# Patient Record
Sex: FEMALE | Race: ASIAN | NOT HISPANIC OR LATINO | URBAN - METROPOLITAN AREA
[De-identification: names, ages, dates, MRNs, and addresses within clinical notes are randomized per-mention and may not be internally consistent; named-entity substitution may affect disease eponyms.]

---

## 2019-02-15 ENCOUNTER — EMERGENCY (EMERGENCY)
Facility: HOSPITAL | Age: 29
LOS: 1 days | Discharge: HOME | End: 2019-02-15
Attending: EMERGENCY MEDICINE | Admitting: EMERGENCY MEDICINE
Payer: MEDICAID

## 2019-02-15 VITALS
DIASTOLIC BLOOD PRESSURE: 86 MMHG | HEART RATE: 70 BPM | TEMPERATURE: 98 F | RESPIRATION RATE: 16 BRPM | OXYGEN SATURATION: 100 % | SYSTOLIC BLOOD PRESSURE: 136 MMHG

## 2019-02-15 PROCEDURE — 64400 NJX AA&/STRD TRIGEMINAL NRV: CPT

## 2019-02-15 PROCEDURE — 99283 EMERGENCY DEPT VISIT LOW MDM: CPT | Mod: 25

## 2019-02-15 RX ORDER — OXYCODONE HYDROCHLORIDE 5 MG/1
5 TABLET ORAL ONCE
Qty: 0 | Refills: 0 | Status: DISCONTINUED | OUTPATIENT
Start: 2019-02-15 | End: 2019-02-15

## 2019-02-15 RX ADMIN — OXYCODONE HYDROCHLORIDE 5 MILLIGRAM(S): 5 TABLET ORAL at 13:12

## 2019-02-15 NOTE — ED PROVIDER NOTE - OBJECTIVE STATEMENT
28F no pmh presents with several days of worsening toothache and R jaw pain. Endorses R lower molar cavity, went to dentist yesterday who recommended oral surgeons but none of them had open spots this month. ROS: Denies fevers, chills, dysphagia, sore throat, neck swelling, neck pain, CP, Abdominal pain, rhinorrhea, new rashes, new LE edema, new visual changes, confusion, headaches, blood in stools, N/V, dysuria, and hematuria.

## 2019-02-15 NOTE — ED PROVIDER NOTE - CLINICAL SUMMARY MEDICAL DECISION MAKING FREE TEXT BOX
Edgard: R lower molar cavity and jaw pain. Referred yest. by dentist to oral surgeon, but unable to see her today, so came to ED for pain meds and referral. No swelling. Pain control. Ask dentist for oral surgeon.

## 2019-02-15 NOTE — ED PROCEDURE NOTE - ATTENDING CONTRIBUTION TO CARE
I performed a face-to-face evaluation of the patient and performed a history and physical examination. I agree with the history and physical examination.    Edgard: I was present during the critical part of the procedure.

## 2019-02-15 NOTE — ED PROVIDER NOTE - NS ED ROS FT
Constitutional: no fevers, no chills.  Eyes: no visual changes.  Ears: no ear drainage, no ear pain.  Nose: no nasal congestion.  Mouth/Throat: no sore throat. +toothache   Cardiovascular: no chest pain.  Respiratory: no shortness of breath, no wheezing, no cough  Gastrointestinal: no nausea, no vomiting, no diarrhea, no abdominal pain.  MSK: no flank pain, no back pain.  Genitourinary: no dysuria, no hematuria.  Skin: no rashes.  Neuro: no headache,   Psychiatric: no known mental health issues.

## 2019-02-15 NOTE — PROGRESS NOTE ADULT - SUBJECTIVE AND OBJECTIVE BOX
Patient is a 28y old  Female who presents with a chief complaint of dental pain in the lower right quadrant    HPI: intraoral pain      PAST MEDICAL & SURGICAL HISTORY:  No pertinent past medical history  No significant past surgical history    Allergies    ibuprofen (Other)      Vital Signs Last 24 Hrs  T(C): 36.8 (15 Feb 2019 11:41), Max: 36.8 (15 Feb 2019 11:41)  T(F): 98.2 (15 Feb 2019 11:41), Max: 98.2 (15 Feb 2019 11:41)  HR: 70 (15 Feb 2019 11:41) (70 - 70)  BP: 136/86 (15 Feb 2019 11:41) (136/86 - 136/86)  BP(mean): --  RR: 16 (15 Feb 2019 11:41) (16 - 16)  SpO2: 100% (15 Feb 2019 11:41) (100% - 100%)    EOE:  TMJ ( - ) clicks                    (  -  ) pops                    (  - ) crepitus             Mandible - Slightly diminished range of motion. Approximately 35-40mm opening              Facial bones and MOM - grossly intact             ( - ) trismus             ( - ) LAD             ( - ) swelling             ( - ) asymmetry             ( + ) palpation - minor discomfort to palpation over the lower right molar region             ( - ) SOB             ( - ) dysphagia             ( - ) LOC    IOE:  permanent dentition: multiple dental restorations, multiple carious teeth, tooth #30 (mandibular right 1st molar) demonstrates gross disto-occlusal caries and loss of coronal tooth structure           hard/soft palate:  ( - ) palatal torus           tongue/FOM - WNL           labial/buccal mucosa - WNL           ( + ) percussion - tooth #30           ( + ) palpation - minor discomfort reported on palpation of distobuccal gingiva of #30           ( - ) swelling            ( - ) abscess           ( - ) sinus tract    The patient reports not having been to the dentist in the past several years, and is aware that she needs dental work done.     DENTAL RADIOGRAPHS: Periapical radiograph taken of #30 - demonstrates radiolucencies consistent with decay on the disto-occlusal aspect of the crown and the distal root, approximately [id number] periapical radiolucency observed on mesial root    ASSESSMENT: The patient's extraoral and intraoral presentation are WNL, with the exception of grossly carious tooth #30. The patient reports 7/10 pain to mechanical/thermal stimulation of this tooth. A radiograph of #30 reveals radiolucencies consistent with coronal/root decay and periapical pathology. While there is evidence of chronic infection of this tooth and it's supporting alveolar bone, there are no swellings or signs of acute infection at this time. I offered pain management through long-lasting local anesthetic block (Marcaine), and she accepted. I explained that this would only provide temporary relief and advised her to seek definitive treatment of this tooth as well as comprehensive dental care through the dental clinic at [facility] or an outside oral surgeon. She reported that she understood and that she would.  *Update* - Shortly after the initial administration of marcaine and apparent relief, the patient reported some residual discomfort and requested further anesthetic administration.     PROCEDURE:   Administration of 3.6cc Marcaine 0.5% HCl with epi 1:200,000, and 3.4cc Lidocaine 2% HCl with epi 1:100,000 via inferior alveolar and long buccal nerve blocks, as well as local infiltrations.     RECOMMENDATIONS:  1) Minimize mechanical/thermal stimulation of the affected area  2) Dental F/U with outpatient dentist for comprehensive dental care.   3) Manage discomfort with OTC oral analgesics (not Ibuprofen due to allergy) until outpatient dental care is obtained  4) If any difficulty swallowing/breathing, fever occur, return to ER.     Ramana Evans, DMD | Pager # [phone]

## 2019-02-15 NOTE — ED PROVIDER NOTE - NSFOLLOWUPINSTRUCTIONS_ED_ALL_ED_FT
1) You were seen in the ER for toothache. The patient has been informed of all concerning signs and symptoms to return to Emergency Department, the necessity to follow up with PMD/Clinic/follow up provided within 2-3 days was explained, and the patient reports understanding of above with capacity and insight. You can look at the discharge papers for some examples of specific signs and symptoms to look out for.   2) Please follow up with Oral Surgery , number given to patient.   3) Please take 400 mg of Ibuprofen (aka Motrin, Advil) and/or 500 mg Acetaminophen (aka Tylenol) every 6 hours, as needed, for mild-moderate pain.  Please do not take these medications if you do no have pain or if you have any history of bleeding disorders, kidney or liver disease. Do not use ibuprofen if you are on blood thinners (anti-coagulation). Don't use more than 3500mg of Tylenol in any 24-hour period. Make sure your other prescription/over-the-counter medications don't contain any Tylenol so you don't take too much. 1) You were seen in the ER for toothache. The patient has been informed of all concerning signs and symptoms to return to Emergency Department, the necessity to follow up with PMD/Clinic/follow up provided within 2-3 days was explained, and the patient reports understanding of above with capacity and insight. You can look at the discharge papers for some examples of specific signs and symptoms to look out for.   2) Please follow up with Oral Surgery within a couple days.  3) For moderate to severe pain take Percocet as prescribed. Please do not operate heavy machinery when you take oxycodone.

## 2019-02-15 NOTE — ED PROVIDER NOTE - ATTENDING CONTRIBUTION TO CARE
I performed a face-to-face evaluation of the patient and performed a history and physical examination. I agree with the history and physical examination.    R lower molar cavity and jaw pain. Referred yest. by dentist to oral surgeon, but unable to see her today, so came to ED for pain meds and referral. No swelling. Pain control. Ask dentist for oral surgeon.

## 2019-02-15 NOTE — ED PROVIDER NOTE - PROGRESS NOTE DETAILS
Dr Simms: Inferior alveolar nerve blocked with Marcaine and lidocaine. will dc with number for oral surgeon in our clinic for f/u as per dentist recs

## 2019-02-15 NOTE — ED ADULT TRIAGE NOTE - CHIEF COMPLAINT QUOTE
Pt c/o toothache x2 days.  Saw dentist yesterday and was told to see oral surgeon.  Here today because of pain

## 2019-02-15 NOTE — ED PROVIDER NOTE - PHYSICAL EXAMINATION
GEN: Well appearing, well nourished, in  apparent distress from pain   HEAD: NCAT  HEENT: PERRL, Airway patent, EOMI, non-erythematous pharynx, no exudates, uvula midline, cavity in 31 area R lower molar, TTP R jaw, no abscess or fluctuance felt, no neck TTP, MMM, neck supple, no LAD, no JVD  LUNG: CTAB, no adventitious sounds, no retractions, no nasal flaring  CV: RRR, no murmurs,   Abd: soft, NTND, no rebound or guarding, BS+ in all quadrants,no CVAT  MSK: WWP, Pulses 2+ in extremities, No edema   Neuro:  AAOx3, Ambulatory with stable gait.  Skin: Warm and dry, no evidence of rash  Psych: normal mood and affect

## 2023-01-23 NOTE — ED ADULT TRIAGE NOTE - HEART RATE (BEATS/MIN)
Vitals capture started with the following parameters, Patient=Adult, Interval=5 min, Initial Pressure=210 mmHg, Deflation Rate=5 mmHg, Cuff placed on Right Arm 70